# Patient Record
Sex: FEMALE | Race: BLACK OR AFRICAN AMERICAN | NOT HISPANIC OR LATINO | Employment: OTHER | URBAN - METROPOLITAN AREA
[De-identification: names, ages, dates, MRNs, and addresses within clinical notes are randomized per-mention and may not be internally consistent; named-entity substitution may affect disease eponyms.]

---

## 2018-01-31 ENCOUNTER — TELEPHONE (OUTPATIENT)
Dept: TRANSPLANT | Facility: CLINIC | Age: 73
End: 2018-01-31

## 2018-01-31 DIAGNOSIS — R06.82 TACHYPNEA: ICD-10-CM

## 2018-01-31 DIAGNOSIS — Z79.899 POLYPHARMACY: Primary | ICD-10-CM

## 2018-01-31 DIAGNOSIS — I27.9 CHRONIC PULMONARY HEART DISEASE: ICD-10-CM

## 2018-01-31 NOTE — TELEPHONE ENCOUNTER
Bautista sánchez/Sandee-Records received. Additional request for records sent (VQ scan, RHC/LHC, sleep studies, PFTs, if available). Scheduling requested to contact pt to schedule PH Consult, any provider.

## 2018-02-14 ENCOUNTER — INITIAL CONSULT (OUTPATIENT)
Dept: TRANSPLANT | Facility: CLINIC | Age: 73
End: 2018-02-14
Payer: MEDICARE

## 2018-02-14 ENCOUNTER — HOSPITAL ENCOUNTER (OUTPATIENT)
Dept: PULMONOLOGY | Facility: CLINIC | Age: 73
Discharge: HOME | End: 2018-02-14
Payer: MEDICARE

## 2018-02-14 ENCOUNTER — TELEPHONE (OUTPATIENT)
Dept: TRANSPLANT | Facility: CLINIC | Age: 73
End: 2018-02-14

## 2018-02-14 VITALS
SYSTOLIC BLOOD PRESSURE: 137 MMHG | WEIGHT: 155.19 LBS | HEIGHT: 60 IN | HEART RATE: 79 BPM | BODY MASS INDEX: 30.47 KG/M2 | DIASTOLIC BLOOD PRESSURE: 90 MMHG | OXYGEN SATURATION: 99 %

## 2018-02-14 VITALS — BODY MASS INDEX: 29.45 KG/M2 | HEIGHT: 60 IN | WEIGHT: 150 LBS

## 2018-02-14 DIAGNOSIS — I10 ESSENTIAL HYPERTENSION: ICD-10-CM

## 2018-02-14 DIAGNOSIS — J98.8 OTHER SPECIFIED RESPIRATORY DISORDERS: ICD-10-CM

## 2018-02-14 DIAGNOSIS — R06.82 TACHYPNEA: ICD-10-CM

## 2018-02-14 DIAGNOSIS — M06.9 RHEUMATOID ARTHRITIS, INVOLVING UNSPECIFIED SITE, UNSPECIFIED RHEUMATOID FACTOR PRESENCE: ICD-10-CM

## 2018-02-14 DIAGNOSIS — Z79.01 LONG TERM (CURRENT) USE OF ANTICOAGULANTS: Primary | ICD-10-CM

## 2018-02-14 DIAGNOSIS — I27.9 CHRONIC PULMONARY HEART DISEASE: Primary | ICD-10-CM

## 2018-02-14 DIAGNOSIS — I27.9 CHRONIC PULMONARY HEART DISEASE: ICD-10-CM

## 2018-02-14 DIAGNOSIS — M34.9 SCLERODERMA: ICD-10-CM

## 2018-02-14 DIAGNOSIS — I27.82 OTHER CHRONIC PULMONARY EMBOLISM WITHOUT ACUTE COR PULMONALE: ICD-10-CM

## 2018-02-14 DIAGNOSIS — N08 SCLERODERMA WITH RENAL INVOLVEMENT: ICD-10-CM

## 2018-02-14 DIAGNOSIS — M34.89 SCLERODERMA WITH RENAL INVOLVEMENT: ICD-10-CM

## 2018-02-14 DIAGNOSIS — Z79.899 POLYPHARMACY: ICD-10-CM

## 2018-02-14 DIAGNOSIS — I27.20 PULMONARY HYPERTENSION: ICD-10-CM

## 2018-02-14 DIAGNOSIS — R76.8 ANA POSITIVE: ICD-10-CM

## 2018-02-14 PROBLEM — I26.99 PULMONARY EMBOLISM WITHOUT ACUTE COR PULMONALE: Status: ACTIVE | Noted: 2018-02-14

## 2018-02-14 PROCEDURE — 3008F BODY MASS INDEX DOCD: CPT | Mod: S$GLB,,, | Performed by: INTERNAL MEDICINE

## 2018-02-14 PROCEDURE — 94618 PULMONARY STRESS TESTING: CPT | Mod: S$GLB,,, | Performed by: INTERNAL MEDICINE

## 2018-02-14 PROCEDURE — 99204 OFFICE O/P NEW MOD 45 MIN: CPT | Mod: S$GLB,,, | Performed by: INTERNAL MEDICINE

## 2018-02-14 PROCEDURE — 1159F MED LIST DOCD IN RCRD: CPT | Mod: S$GLB,,, | Performed by: INTERNAL MEDICINE

## 2018-02-14 PROCEDURE — 99999 PR PBB SHADOW E&M-EST. PATIENT-LVL III: CPT | Mod: PBBFAC,,, | Performed by: INTERNAL MEDICINE

## 2018-02-14 PROCEDURE — 1126F AMNT PAIN NOTED NONE PRSNT: CPT | Mod: S$GLB,,, | Performed by: INTERNAL MEDICINE

## 2018-02-14 RX ORDER — METOPROLOL TARTRATE 50 MG/1
50 TABLET ORAL 2 TIMES DAILY
COMMUNITY
Start: 2018-01-25

## 2018-02-14 RX ORDER — SIMVASTATIN 40 MG/1
40 TABLET, FILM COATED ORAL DAILY
Status: ON HOLD | COMMUNITY
Start: 2018-01-03 | End: 2021-02-09 | Stop reason: HOSPADM

## 2018-02-14 RX ORDER — POTASSIUM CHLORIDE 1500 MG/1
20 TABLET, EXTENDED RELEASE ORAL DAILY
COMMUNITY
Start: 2017-12-09

## 2018-02-14 RX ORDER — FUROSEMIDE 20 MG/1
20 TABLET ORAL DAILY PRN
COMMUNITY
Start: 2018-01-23 | End: 2021-02-08

## 2018-02-14 RX ORDER — CYPROHEPTADINE HYDROCHLORIDE 4 MG/1
4 TABLET ORAL DAILY
COMMUNITY
Start: 2017-12-04 | End: 2021-02-08

## 2018-02-14 RX ORDER — APIXABAN 5 MG/1
5 TABLET, FILM COATED ORAL 2 TIMES DAILY
COMMUNITY
Start: 2018-01-20

## 2018-02-14 RX ORDER — PAROXETINE 10 MG/1
10 TABLET, FILM COATED ORAL DAILY
COMMUNITY
Start: 2017-12-05 | End: 2021-02-08

## 2018-02-14 RX ORDER — AMLODIPINE BESYLATE 5 MG/1
5 TABLET ORAL DAILY
COMMUNITY
Start: 2017-12-26 | End: 2021-02-08

## 2018-02-14 NOTE — PROCEDURES
Yulia Sloan is a 72 y.o.  female patient, who presents for a 6 minute walk test ordered by Ashwini Leal MD.  The diagnosis is Qualify for Oxygen; Pulmonary Hypertension.  The patient's BMI is 29.4 kg/m2. Predicted distance (lower limit of normal) is 280.61 meters.    Test Results:    The test was completed without stopping.  The total time walked was 360 seconds.  During walking, the patient reported:  Dyspnea.  The patient used supplemental oxygen during repeat testing.     02/14/2018---------Distance: 335.28 meters (1100 feet)     O2 Sat % Supplemental Oxygen Heart Rate Blood Pressure Dominique Scale   Pre-exercise  (Resting) 96 % Room Air 71 bpm 165/77 mmHg 1   During Exercise 87 % Room Air 109 bpm 189/91 mmHg 4   Post-exercise   97 % Room Air  83 bpm 163/81 mmHg      Recovery Time: 216 seconds    Oxygen Qualification:     O2 Sat % Supplemental Oxygen Heart Rate Blood Pressure Dominique Scale   Pre-exercise  (Resting) 98 % 2 L/M  79 bpm  158/76 mmHg 0    During Exercise 98 %  2 L/M  82 bpm  158/73 mmHg 0    Post-exercise   98 %  2 L/M  80 bpm        Performing nurse/tech:  MILAGROS Amato RRT    PREVIOUS STUDY:   The patient has not had a previous study.      CLINICAL INTERPRETATION:  Six minute walk distance is 335.28 meters (1100 feet) with somewhat heavy dyspnea.  During exercise, there was significant desaturation while breathing room air.  Both blood pressure and heart rate increased significantly with walking.  Hypertension was present prior to exercise.  The patient did not report non-pulmonary symptoms during exercise.  The patient may benefit from using supplemental oxygen during exertion.  No previous study performed.  Based upon age and body mass index, exercise capacity is normal.   Oxygen saturation did improve while breathing supplemental oxygen.

## 2018-02-14 NOTE — PROGRESS NOTES
Subjective:    Patient ID:  Yulia Sloan is a 72 y.o. female who presents for evaluation of Pulmonary Hypertension.    HPI    Six Minute Walk Test:   335 m (   m in    )                                              O2 sat 96->87  %                                                           HR 71 ->109                                                                 BP  165 /77  ->189/92                                                        Dominique   1  -> 4    Echo        No results found for this or any previous visit.  Outside echo EF 60 % PSP 85 mmhg Diastolic dysfunction grade 1    EKG   /    / not done      RHC   /      / not done  Ra   RV  PA  PCWP  PA sat  CO  CI      CXR 1 / 31 / 2018  Normal pulmonary vasculature      CT Chest 1 /30 / 2018   Negative for PE, bilateral pleura effussions, bilateral infiltrate consistent with edema       PFTs     /     / not done        V/Q Scan  /    / not done      Review of Systems   Constitution: Negative for chills, decreased appetite, diaphoresis, fever, weakness, malaise/fatigue, night sweats, weight gain and weight loss.   HENT: Negative.    Cardiovascular: Positive for dyspnea on exertion. Negative for chest pain, claudication, cyanosis, irregular heartbeat, leg swelling, near-syncope, orthopnea and palpitations.   Respiratory: Positive for snoring. Negative for cough, hemoptysis, shortness of breath, sleep disturbances due to breathing, sputum production and wheezing.    Endocrine: Negative.    Hematologic/Lymphatic: Negative.    Skin: Negative.    Musculoskeletal: Negative.    Gastrointestinal: Negative for abdominal pain and diarrhea.   Neurological: Negative.    Psychiatric/Behavioral: Negative.         Objective:  BP (!) 169/80 (BP Location: Left arm, Patient Position: Sitting, BP Method: Medium (Automatic))   Pulse 79   Ht 5' (1.524 m)   Wt 70.4 kg (155 lb 3.3 oz)   SpO2 99%   BMI 30.31 kg/m²       Physical Exam   Constitutional: She is oriented to  person, place, and time. She appears well-developed and well-nourished.   HENT:   Head: Normocephalic and atraumatic.   Eyes: Conjunctivae and EOM are normal. Pupils are equal, round, and reactive to light.   Neck: Neck supple. No JVD present. No tracheal deviation present. No thyromegaly present.   Cardiovascular: Normal rate and regular rhythm.  Exam reveals no gallop and no friction rub.    Murmur heard.   Harsh midsystolic murmur is present with a grade of 2/6  at the upper right sternal border radiating to the neck  Pulmonary/Chest: Effort normal and breath sounds normal. No respiratory distress. She has no wheezes. She has no rales. She exhibits no tenderness.   Abdominal: Soft. Bowel sounds are normal. She exhibits no distension and no mass. There is no tenderness. There is no rebound and no guarding.   Musculoskeletal: Normal range of motion. She exhibits no edema or tenderness.   Lymphadenopathy:     She has no cervical adenopathy.   Neurological: She is alert and oriented to person, place, and time. She has normal reflexes. No cranial nerve deficit. She exhibits normal muscle tone. Coordination normal.   Skin: Skin is warm and dry.   Psychiatric: She has a normal mood and affect. Her behavior is normal. Thought content normal.           Chemistry    No results found for: NA, K, CL, CO2, BUN, CREATININE, GLU No results found for: CALCIUM, ALKPHOS, AST, ALT, BILITOT, ESTGFRAFRICA, EGFRNONAA                 Assessment:       1. Essential hypertension    2. Pulmonary hypertension    3. Other chronic pulmonary embolism without acute cor pulmonale      WHO Group:  Functional ClassIII     Plan:       PFT  VQ scan 2 D echo RHC    Reassess therapy

## 2018-02-15 ENCOUNTER — TELEPHONE (OUTPATIENT)
Dept: TRANSPLANT | Facility: CLINIC | Age: 73
End: 2018-02-15

## 2018-02-15 NOTE — TELEPHONE ENCOUNTER
Spoke w/pt and confirmed last dose of eliquis will be 2/19. Pt understands not to resume eliquis until 2/22, after cath. Pt has my direct phone number for any further questions/concerns.

## 2018-02-15 NOTE — TELEPHONE ENCOUNTER
----- Message from Jim Gutierres MD sent at 2/15/2018 11:39 AM CST -----  OK. H  ----- Message -----  From: MICHELLE Sheppard, RN  Sent: 2/15/2018  11:25 AM  To: Jim Gutierres MD    Are you ok w/holding her eliquis for 48 hrs before cath without bridging? Thank you,    Racquel

## 2018-02-21 DIAGNOSIS — I27.20 PULMONARY HYPERTENSION: Primary | ICD-10-CM

## 2018-02-21 DIAGNOSIS — I10 ESSENTIAL (PRIMARY) HYPERTENSION: ICD-10-CM

## 2018-02-22 ENCOUNTER — HOSPITAL ENCOUNTER (OUTPATIENT)
Dept: CARDIOLOGY | Facility: CLINIC | Age: 73
Discharge: HOME | End: 2018-02-22
Attending: INTERNAL MEDICINE
Payer: MEDICARE

## 2018-02-22 ENCOUNTER — HOSPITAL ENCOUNTER (OUTPATIENT)
Dept: PULMONOLOGY | Facility: CLINIC | Age: 73
Discharge: HOME | End: 2018-02-22
Payer: MEDICARE

## 2018-02-22 ENCOUNTER — HOSPITAL ENCOUNTER (OUTPATIENT)
Facility: HOSPITAL | Age: 73
Discharge: HOME | End: 2018-02-22
Attending: INTERNAL MEDICINE | Admitting: INTERNAL MEDICINE
Payer: MEDICARE

## 2018-02-22 ENCOUNTER — HOSPITAL ENCOUNTER (OUTPATIENT)
Dept: RADIOLOGY | Facility: HOSPITAL | Age: 73
Discharge: HOME | End: 2018-02-22
Attending: INTERNAL MEDICINE
Payer: MEDICARE

## 2018-02-22 ENCOUNTER — SURGERY (OUTPATIENT)
Age: 73
End: 2018-02-22

## 2018-02-22 DIAGNOSIS — I27.9 CHRONIC PULMONARY HEART DISEASE: ICD-10-CM

## 2018-02-22 DIAGNOSIS — I10 ESSENTIAL HYPERTENSION: Primary | ICD-10-CM

## 2018-02-22 DIAGNOSIS — J98.8 OTHER SPECIFIED RESPIRATORY DISORDERS: ICD-10-CM

## 2018-02-22 LAB
DIASTOLIC DYSFUNCTION: YES
ESTIMATED PA SYSTOLIC PRESSURE: 54.84
PRE FEV1 FVC: 79
PRE FEV1: 1.78
PRE FVC: 2.26
PREDICTED FEV1 FVC: 80
PREDICTED FEV1: 1.96
PREDICTED FVC: 2.51
RETIRED EF AND QEF - SEE NOTES: 60 (ref 55–65)
TRICUSPID VALVE REGURGITATION: ABNORMAL

## 2018-02-22 PROCEDURE — 93451 RIGHT HEART CATH: CPT | Mod: 26,,, | Performed by: INTERNAL MEDICINE

## 2018-02-22 PROCEDURE — 94727 GAS DIL/WSHOT DETER LNG VOL: CPT | Mod: S$GLB,,, | Performed by: INTERNAL MEDICINE

## 2018-02-22 PROCEDURE — A9540 TC99M MAA: HCPCS

## 2018-02-22 PROCEDURE — 94010 BREATHING CAPACITY TEST: CPT | Mod: S$GLB,,, | Performed by: INTERNAL MEDICINE

## 2018-02-22 PROCEDURE — C1894 INTRO/SHEATH, NON-LASER: HCPCS

## 2018-02-22 PROCEDURE — 25000003 PHARM REV CODE 250

## 2018-02-22 PROCEDURE — 78582 LUNG VENTILAT&PERFUS IMAGING: CPT | Mod: 26,,, | Performed by: RADIOLOGY

## 2018-02-22 PROCEDURE — 93306 TTE W/DOPPLER COMPLETE: CPT | Mod: S$GLB,,, | Performed by: INTERNAL MEDICINE

## 2018-02-22 PROCEDURE — 94729 DIFFUSING CAPACITY: CPT | Mod: S$GLB,,, | Performed by: INTERNAL MEDICINE

## 2018-02-22 NOTE — DISCHARGE INSTRUCTIONS
Restart eliquis today    AFTER THE PROCEDURE:  -You may remove the bandage in 24 hours and wash with soap and water.  -You may shower, but do not soak in a tub for three days.   PRECAUTIONS FOR THE NEXT 24 HOURS:  -If you need to cough, sneeze, have a bowel movement, or bear down, hold pressure over your bandage.  -Do not  anything heavier than a gallon of milk(about 5 pounds)  -Avoid excessive bending over.  SYMPTOMS TO WATCH FOR AND REPORT TO YOUR DOCTOR:  -BLEEDING: hold pressure over the site until bleeding stops. Proceed to Emergency Room by ambulance (do not drive yourself) if unable to stop bleeding. Notify your doctor.  -HEMATOMA(hard bruise under the skin): Cuate around the bruise if one develops. Call your doctor if it increases in size or if you have difficulty talking, swallowing, breathing or anything unusual.  SIGNS OF INFECTION:Fever (temperature over 100.5 F), pus or redness  -RASH  -CHEST PAIN OR SHORTNESS OF BREATH    You may call you coordinator in the Heart Failure/Heart Transplant/Pulmonary Hypertension Clinic at [phone] during normal business hours(Monday through Friday from 8 A.M. to 5 P.M.) After hours, call the Heart Transplant Service doctor on call at [phone].

## 2018-02-22 NOTE — H&P
Subjective:      Nicole Sloan is a 72 y.o. female with a who needs RHC  for evaluation of SOB / PA pressures.  Currently able to lay flat.      Past Medical History:   Diagnosis Date    Hyperlipidemia     Hypertension     Pulmonary embolism     Pulmonary hypertension      Past Surgical History:   Procedure Laterality Date    CARPAL TUNNEL RELEASE      CATARACT EXTRACTION       Social History     Social History    Marital status: Unknown     Spouse name: N/A    Number of children: N/A    Years of education: N/A     Occupational History    Not on file.     Social History Main Topics    Smoking status: Never Smoker    Smokeless tobacco: Not on file    Alcohol use No    Drug use: Unknown    Sexual activity: Not on file     Other Topics Concern    Not on file     Social History Narrative    No narrative on file     Family History   Problem Relation Age of Onset    Alzheimer's disease Mother     Hypertension Mother     Cancer Father     Alcohol abuse Father            Other significant clinical information:  Review of patient's allergies indicates:  No Known Allergies  No current facility-administered medications on file prior to encounter.      Current Outpatient Prescriptions on File Prior to Encounter   Medication Sig    amLODIPine (NORVASC) 5 MG tablet Take 5 mg by mouth once daily.    cyproheptadine (PERIACTIN) 4 mg tablet Take 4 mg by mouth once daily.    ELIQUIS 5 mg Tab Take 5 mg by mouth 2 (two) times daily.    furosemide (LASIX) 20 MG tablet Take 20 mg by mouth daily as needed.    KLOR-CON M20 20 mEq tablet Take 20 mEq by mouth 2 (two) times daily.    metoprolol tartrate (LOPRESSOR) 50 MG tablet Take 50 mg by mouth 2 (two) times daily.    paroxetine (PAXIL) 10 MG tablet Take 10 mg by mouth once daily.    simvastatin (ZOCOR) 40 MG tablet Take 40 mg by mouth every evening.            Objective:      Physical Exam  BP  154 / 99   P  59    General Appearance:    Alert, cooperative, no  distress, appears stated age   Head:    Normocephalic, without obvious abnormality, atraumatic   Eyes:    PERRL, conjunctiva/corneas clear, EOM's intact, fundi     benign, both eyes   Ears:    Normal TM's and external ear canals, both ears   Nose:   Nares normal, septum midline, mucosa normal, no drainage    or sinus tenderness   Throat:   Lips, mucosa, and tongue normal; teeth and gums normal   Neck:   Supple, symmetrical, trachea midline, no adenopathy;     thyroid:  no enlargement/tenderness/nodules; no carotid    bruit or JVD   Back:     Symmetric, no curvature, ROM normal, no CVA tenderness   Lungs:     Clear to auscultation bilaterally, respirations unlabored   Chest Wall:    No tenderness or deformity    Heart:    Regular rate and rhythm, S1 and S2 normal, no murmur, rub   or gallop   Breast Exam:    No tenderness, masses, or nipple abnormality   Abdomen:     Soft, non-tender, bowel sounds active all four quadrants,     no masses, no organomegaly   Genitalia:    Normal female without lesion, discharge or tenderness   Rectal:    Normal tone, no masses or tenderness;    guaiac negative stool   Extremities:   Extremities normal, atraumatic, no cyanosis or edema   Pulses:   2+ and symmetric all extremities   Skin:   Skin color, texture, turgor normal, no rashes or lesions   Lymph nodes:   Cervical, supraclavicular, and axillary nodes normal   Neurologic:   CNII-XII intact, normal strength, sensation and reflexes     throughout         Lab Review   Lab Results   Component Value Date    WBC 15.65 (H) 02/22/2018    HGB 11.7 (L) 02/22/2018    HCT 35.1 (L) 02/22/2018    MCV 86 02/22/2018     02/22/2018     Lab Results   Component Value Date    INR 0.9 02/22/2018           Assessment:       Plan:     I have explained the risks, benefits, and alternatives of the procedure in detail.  The patient expresses understanding and all questions have been answered.  The patient agrees to the proceed as planned.  RHC  via  [facility]   Micropuncture access needle will be used to minimize bleeding risk.

## 2018-02-22 NOTE — DISCHARGE SUMMARY
Admit 02/22/2018  Discharge  02/22/2018  Discharge / Principle diagnosis  pulmonary hypertension.    Discharge attending Yane Guadalupe MD  Hospital course.  Came in for RHC . Tolerated procedure well (see full results in cardiac procedure section).  Results discussed with patient / caregiver.   Discharge condition / disposition Good / home   discharge activity activity as tolerated    Discharge diet diet as tolerated / unchanged from admission  Discharge medication   No current facility-administered medications on file prior to encounter.      Current Outpatient Prescriptions on File Prior to Encounter   Medication Sig    amLODIPine (NORVASC) 5 MG tablet Take 5 mg by mouth once daily.    cyproheptadine (PERIACTIN) 4 mg tablet Take 4 mg by mouth once daily.    ELIQUIS 5 mg Tab Take 5 mg by mouth 2 (two) times daily.    furosemide (LASIX) 20 MG tablet Take 20 mg by mouth daily as needed.    KLOR-CON M20 20 mEq tablet Take 20 mEq by mouth 2 (two) times daily.    metoprolol tartrate (LOPRESSOR) 50 MG tablet Take 50 mg by mouth 2 (two) times daily.    paroxetine (PAXIL) 10 MG tablet Take 10 mg by mouth once daily.    simvastatin (ZOCOR) 40 MG tablet Take 40 mg by mouth every evening.     Followup in clinic as scheduled

## 2018-03-03 DIAGNOSIS — I27.20 PULMONARY HYPERTENSION: ICD-10-CM

## 2018-03-03 DIAGNOSIS — I27.82 OTHER CHRONIC PULMONARY EMBOLISM WITHOUT ACUTE COR PULMONALE: Primary | ICD-10-CM

## 2018-03-03 NOTE — Clinical Note
Can we schedule this when she comes back  I have discussed this with pt at her [facility] (she may not remember so I am happy to discuss again)

## 2018-03-22 ENCOUNTER — TELEPHONE (OUTPATIENT)
Dept: TRANSPLANT | Facility: CLINIC | Age: 73
End: 2018-03-22

## 2018-03-22 NOTE — TELEPHONE ENCOUNTER
----- Message from Avelina Berry sent at 3/22/2018  8:51 AM CDT -----  Contact: Pt  Pt called to speak to the nurse regarding the result of her testing and would like a call back today.    Pt can be reached at [phone].    Thanks

## 2018-03-22 NOTE — TELEPHONE ENCOUNTER
Returned call to patient, who inquired regarding recent diagnostic results. Pt has concerns about being able to return for follow up appointment secondary to finances. Offered to send pt gas card this time, and she agrees. She is still requesting for a physician to call her to discuss things with her, but will try to come for her next clinic visit next week.

## 2021-02-08 PROBLEM — R09.89 SUSPECTED CEREBROVASCULAR ACCIDENT (CVA): Status: ACTIVE | Noted: 2021-02-08

## 2021-02-08 PROBLEM — R56.9 SEIZURE: Status: ACTIVE | Noted: 2021-02-08

## 2021-02-08 PROBLEM — I16.1 HYPERTENSIVE EMERGENCY: Status: ACTIVE | Noted: 2021-02-08

## 2021-02-08 PROBLEM — R29.90 STROKE-LIKE SYMPTOMS: Status: RESOLVED | Noted: 2021-02-08 | Resolved: 2021-02-08

## 2021-02-08 PROBLEM — R29.90 STROKE-LIKE SYMPTOMS: Status: ACTIVE | Noted: 2021-02-08

## 2021-02-10 PROBLEM — I16.1 HYPERTENSIVE EMERGENCY: Status: RESOLVED | Noted: 2021-02-08 | Resolved: 2021-02-10

## 2021-05-17 ENCOUNTER — EPISODE CHANGES (OUTPATIENT)
Dept: TRANSPLANT | Facility: CLINIC | Age: 76
End: 2021-05-17